# Patient Record
Sex: FEMALE | Race: WHITE | NOT HISPANIC OR LATINO
[De-identification: names, ages, dates, MRNs, and addresses within clinical notes are randomized per-mention and may not be internally consistent; named-entity substitution may affect disease eponyms.]

---

## 2018-11-01 PROBLEM — Z00.00 ENCOUNTER FOR PREVENTIVE HEALTH EXAMINATION: Status: ACTIVE | Noted: 2018-11-01

## 2018-11-16 ENCOUNTER — APPOINTMENT (OUTPATIENT)
Dept: GYNECOLOGIC ONCOLOGY | Facility: CLINIC | Age: 68
End: 2018-11-16
Payer: MEDICARE

## 2018-11-16 VITALS
WEIGHT: 129 LBS | BODY MASS INDEX: 22.02 KG/M2 | HEART RATE: 78 BPM | DIASTOLIC BLOOD PRESSURE: 77 MMHG | OXYGEN SATURATION: 92 % | SYSTOLIC BLOOD PRESSURE: 120 MMHG | HEIGHT: 64 IN

## 2018-11-16 DIAGNOSIS — Z78.9 OTHER SPECIFIED HEALTH STATUS: ICD-10-CM

## 2018-11-16 DIAGNOSIS — R19.00 INTRA-ABDOMINAL AND PELVIC SWELLING, MASS AND LUMP, UNSPECIFIED SITE: ICD-10-CM

## 2018-11-16 PROCEDURE — 99204 OFFICE O/P NEW MOD 45 MIN: CPT | Mod: 25

## 2018-11-16 PROCEDURE — 57454 BX/CURETT OF CERVIX W/SCOPE: CPT

## 2018-11-21 PROBLEM — R19.00 PELVIC MASS: Status: ACTIVE | Noted: 2018-11-15

## 2018-11-21 PROBLEM — Z78.9 SOCIAL ALCOHOL USE: Status: ACTIVE | Noted: 2018-11-21

## 2018-11-27 LAB — LH SURGICAL PATHOLOGY FINAL REPORT: NORMAL

## 2019-09-11 ENCOUNTER — APPOINTMENT (OUTPATIENT)
Dept: PULMONOLOGY | Facility: CLINIC | Age: 69
End: 2019-09-11
Payer: MEDICARE

## 2019-09-11 VITALS
HEART RATE: 81 BPM | HEIGHT: 64 IN | OXYGEN SATURATION: 98 % | SYSTOLIC BLOOD PRESSURE: 110 MMHG | DIASTOLIC BLOOD PRESSURE: 80 MMHG | TEMPERATURE: 97.7 F | BODY MASS INDEX: 21.85 KG/M2 | WEIGHT: 128 LBS

## 2019-09-11 DIAGNOSIS — Z86.39 PERSONAL HISTORY OF OTHER ENDOCRINE, NUTRITIONAL AND METABOLIC DISEASE: ICD-10-CM

## 2019-09-11 PROCEDURE — 94060 EVALUATION OF WHEEZING: CPT

## 2019-09-11 PROCEDURE — 94727 GAS DIL/WSHOT DETER LNG VOL: CPT

## 2019-09-11 PROCEDURE — 94729 DIFFUSING CAPACITY: CPT

## 2019-09-11 PROCEDURE — 99204 OFFICE O/P NEW MOD 45 MIN: CPT | Mod: 25

## 2019-09-11 RX ORDER — LEVOTHYROXINE SODIUM 137 UG/1
TABLET ORAL
Refills: 0 | Status: ACTIVE | COMMUNITY

## 2019-09-11 NOTE — CONSULT LETTER
[Dear  ___] : Dear  [unfilled], [( Thank you for referring [unfilled] for consultation for _____ )] : Thank you for referring [unfilled] for consultation for [unfilled] [Please see my note below.] : Please see my note below. [Consult Closing:] : Thank you very much for allowing me to participate in the care of this patient.  If you have any questions, please do not hesitate to contact me. [Sincerely,] : Sincerely, [FreeTextEntry2] : Lorena Mix MD\par [address]\[place] [address] [FreeTextEntry3] : Bernie Velarde MD, FCCP\par

## 2019-09-11 NOTE — ASSESSMENT
[FreeTextEntry1] : Data reviewed:\par \par CT chest [facility] 8/23/2019 personally reviewed: 1. Patchy nodularity within lateral right middle lobe of lung with bronchiectasis and calcifications compatible with distal airway mucoid impaction secondary to infectious/inflammatory changes. 2. Scattered subcentimeter pulmonary nodules. Recommend follow-up in 12 months with repeat noncontrast chest CT to ensure stability. // though not commented on in report, there's significant mosaic attenuation\par \par [place] 09/11/2019 : restricted / FENO couldn't do\par PFT 9/11/19: NSVA, no sig BD response, low normal TLC, normal DLCO\par \par Impression:\par Chronic cough, nonspecific eval today\par \par Plan:\par Provisional diagnosis of asthma based on history, exam and imaging. Breo 100/5 for a month and then return for re-evaluation. Continue nasal spray.

## 2019-09-11 NOTE — PHYSICAL EXAM
[General Appearance - Well Developed] : well developed [General Appearance - Well Nourished] : well nourished [Normal Conjunctiva] : the conjunctiva exhibited no abnormalities [General Appearance - In No Acute Distress] : no acute distress [Normal Oropharynx] : normal oropharynx [Heart Sounds] : normal S1 and S2 [Heart Rate And Rhythm] : heart rate and rhythm were normal [Murmurs] : no murmurs present [Edema] : no peripheral edema present [Abdomen Soft] : soft [Bowel Sounds] : normal bowel sounds [Abdomen Tenderness] : non-tender [Nail Clubbing] : no clubbing of the fingernails [Cyanosis, Localized] : no localized cyanosis [] : no rash [Affect] : the affect was normal [FreeTextEntry1] : insp > exp wheezing

## 2019-09-11 NOTE — HISTORY OF PRESENT ILLNESS
[FreeTextEntry1] : 09/11/2019: Asked to evaluate patient by Dr Lorena Mix for cough for months. Traveled in spring, got sinus infection and coughing since then. Productive of sputum and then feels relief after producing that sputum. No history of lung disease. Smoked in her 20s only. Physically active. A little wheeze. Sometimes feels dyspneic with a cough attack. Does have nasal drip. No known allergies. Denies GERD sx. 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 Lives apartment in [place] for many years, no animal exposures, no mold/water damage. No fatigue, no weight loss. Her ENT put her on a nasal spray a few weeks ago and this has not impacted the cough much if at all.\par

## 2019-10-29 ENCOUNTER — APPOINTMENT (OUTPATIENT)
Dept: GYNECOLOGIC ONCOLOGY | Facility: CLINIC | Age: 69
End: 2019-10-29
Payer: MEDICARE

## 2019-10-29 PROCEDURE — 57452 EXAM OF CERVIX W/SCOPE: CPT

## 2019-10-29 PROCEDURE — 99215 OFFICE O/P EST HI 40 MIN: CPT | Mod: 25

## 2019-10-30 NOTE — REASON FOR VISIT
[FreeTextEntry1] : 69 yo re-referred by Dr. Diaz for consultation regarding abnormla pap. Unable to obtain ECC due to cervical stenosis

## 2019-10-30 NOTE — PHYSICAL EXAM
[Normal] : Recto-Vaginal Exam: Normal [de-identified] : cervical stenosis, unable to visualize tranformation zone

## 2019-10-30 NOTE — PROCEDURE
[Colposcopy] : colposcopy [LGSIL] : low grade squamous intraepithelial lesion [HPV high risk] : PCR positive for high risk HPV [Patient] : the patient [Cervical Pap Performed] : a cervical pap smear was performed [No Abnormalities] : no abnormalities seen [No Complications] : none [Tolerated Well] : the patient tolerated the procedure well [Post procedure instructions and information given] : post procedure instructions and information given and reviewed with patient. [Vaginal Pap Performed] : no vaginal pap smear was performed [SCJ Fully Visualized] : the squamocolumnar junction was not fully visualized [Biopsies Taken: # ___] : no biopsies were taken of the cervix [ECC Done] : an endocervical curettage was not performed [Endometrial Biopsy Done] : an endometrial biopsy was not performed [de-identified] : Cervical stenosis did not allow for ECC sampling

## 2019-10-30 NOTE — HISTORY OF PRESENT ILLNESS
[FreeTextEntry1] : Problem List\par 1) Abnormal Paps\par    a)Pap 5/31/18 ASCUS, HPV/HR Positive (Non 16/18)\par     b)Colposcopy 7/5/18 ECC inadequate\par     c)Pap 10/11/18 ASCUS, HPV/HR Positive (Non 16/18)\par     d) Colpo with Dr. SEGURA 11/2018 normal *ECC scanty with 1 miniscule fragment equivocal for atypia\par     e) Pap 9/26/19 LSIL HPV+ (non 16/18)

## 2019-11-07 ENCOUNTER — APPOINTMENT (OUTPATIENT)
Dept: PULMONOLOGY | Facility: CLINIC | Age: 69
End: 2019-11-07
Payer: MEDICARE

## 2019-11-07 VITALS
HEIGHT: 64 IN | BODY MASS INDEX: 22.2 KG/M2 | HEART RATE: 70 BPM | WEIGHT: 130 LBS | DIASTOLIC BLOOD PRESSURE: 68 MMHG | TEMPERATURE: 97.8 F | SYSTOLIC BLOOD PRESSURE: 100 MMHG | OXYGEN SATURATION: 96 %

## 2019-11-07 DIAGNOSIS — R05 COUGH: ICD-10-CM

## 2019-11-07 PROCEDURE — 90662 IIV NO PRSV INCREASED AG IM: CPT

## 2019-11-07 PROCEDURE — G0008: CPT

## 2019-11-07 PROCEDURE — 94010 BREATHING CAPACITY TEST: CPT

## 2019-11-07 PROCEDURE — 99213 OFFICE O/P EST LOW 20 MIN: CPT | Mod: 25

## 2019-11-07 RX ORDER — FLUTICASONE FUROATE AND VILANTEROL TRIFENATATE 100; 25 UG/1; UG/1
100-25 POWDER RESPIRATORY (INHALATION) DAILY
Qty: 1 | Refills: 1 | Status: DISCONTINUED | COMMUNITY
Start: 2019-09-11 | End: 2019-11-07

## 2019-11-07 NOTE — CONSULT LETTER
[Dear  ___] : Dear  [unfilled], [Courtesy Letter:] : I had the pleasure of seeing your patient, [unfilled], in my office today. [Please see my note below.] : Please see my note below. [FreeTextEntry2] : Lorena Mix MD\par [address]\[place] [address] [Sincerely,] : Sincerely, [Consult Closing:] : Thank you very much for allowing me to participate in the care of this patient.  If you have any questions, please do not hesitate to contact me. [FreeTextEntry3] : Bernie Velarde MD, FCCP\par

## 2019-11-07 NOTE — PHYSICAL EXAM
[General Appearance - Well Nourished] : well nourished [General Appearance - Well Developed] : well developed [General Appearance - In No Acute Distress] : no acute distress [Heart Rate And Rhythm] : heart rate and rhythm were normal [Heart Sounds] : normal S1 and S2 [Murmurs] : no murmurs present [Auscultation Breath Sounds / Voice Sounds] : lungs were clear to auscultation bilaterally

## 2019-11-07 NOTE — HISTORY OF PRESENT ILLNESS
[FreeTextEntry1] : 09/11/2019: Asked to evaluate patient by Dr Lorena Mix for cough for months. Traveled in spring, got sinus infection and coughing since then. Productive of sputum and then feels relief after producing that sputum. No history of lung disease. Smoked in her 20s only. Physically active. A little wheeze. Sometimes feels dyspneic with a cough attack. Does have nasal drip. No known allergies. Denies GERD sx. 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 Lives apartment in [place] for many years, no animal exposures, no mold/water damage. No fatigue, no weight loss. Her ENT put her on a nasal spray a few weeks ago and this has not impacted the cough much if at all.\par \par 11/7/19: She took the Breo and found it to be very effective; after one month the cough was gone and she stopped the Breo and the cough has not recurred.\par

## 2019-11-07 NOTE — HISTORY OF PRESENT ILLNESS
[FreeTextEntry1] : 09/11/2019: Asked to evaluate patient by Dr Lorena Mix for cough for months. Traveled in spring, got sinus infection and coughing since then. Productive of sputum and then feels relief after producing that sputum. No history of lung disease. Smoked in her 20s only. Physically active. A little wheeze. Sometimes feels dyspneic with a cough attack. Does have nasal drip. No known allergies. Denies GERD sx. 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 Lives apartment in [place] for many years, no animal exposures, no mold/water damage. No fatigue, no weight loss. Her ENT put her on a nasal spray a few weeks ago and this has not impacted the cough much if at all.\par \par 11/7/19: She took the Breo and found it to be very effective; after one month the cough was gone and she stopped the Breo and the cough has not recurred.\par

## 2019-11-07 NOTE — ASSESSMENT
[FreeTextEntry1] : Data reviewed:\par \par CT chest [facility] 8/23/2019: 1. Patchy nodularity within lateral right middle lobe of lung with bronchiectasis and calcifications compatible with distal airway mucoid impaction secondary to infectious/inflammatory changes. 2. Scattered subcentimeter pulmonary nodules. Recommend follow-up in 12 months with repeat noncontrast chest CT to ensure stability. // though not commented on in report, there's significant mosaic attenuation\par \par [place] 09/11/2019 : restricted, FEV1 65% / FENO couldn't do\par PFT 9/11/19: NSVA, no sig BD response, low normal TLC, normal DLCO\par [place] 11/7/19: restricted, FEV1 71%\par \par Impression:\par Chronic cough, working dx was asthma, resolved at this time\par \par Plan:\par Discussed that working dx was asthma but this was unproven. She improved while on Breo (because of Breo?) and is not coughing one month after discontinuing. Stay off inhalers, and return if the symptoms recur.\par Flu vaccine given.\par Verify w Dr Mix that she had pneumococcal vax.

## 2019-11-07 NOTE — CONSULT LETTER
[Dear  ___] : Dear  [unfilled], [Courtesy Letter:] : I had the pleasure of seeing your patient, [unfilled], in my office today. [Consult Closing:] : Thank you very much for allowing me to participate in the care of this patient.  If you have any questions, please do not hesitate to contact me. [Please see my note below.] : Please see my note below. [FreeTextEntry2] : Lorena Mix MD\par [address]\[place] [address] [Sincerely,] : Sincerely, [FreeTextEntry3] : Bernie Velarde MD, FCCP\par

## 2019-11-07 NOTE — PHYSICAL EXAM
[General Appearance - Well Nourished] : well nourished [General Appearance - In No Acute Distress] : no acute distress [General Appearance - Well Developed] : well developed [Heart Rate And Rhythm] : heart rate and rhythm were normal [Murmurs] : no murmurs present [Heart Sounds] : normal S1 and S2 [Auscultation Breath Sounds / Voice Sounds] : lungs were clear to auscultation bilaterally

## 2019-11-07 NOTE — ASSESSMENT
[FreeTextEntry1] : Data reviewed:\par \par CT chest [facility] 8/23/2019: 1. Patchy nodularity within lateral right middle lobe of lung with bronchiectasis and calcifications compatible with distal airway mucoid impaction secondary to infectious/inflammatory changes. 2. Scattered subcentimeter pulmonary nodules. Recommend follow-up in 12 months with repeat noncontrast chest CT to ensure stability. // though not commented on in report, there's significant mosaic attenuation\par \par [place] 09/11/2019 : restricted, FEV1 65% / FENO couldn't do\par PFT 9/11/19: NSVA, no sig BD response, low normal TLC, normal DLCO\par [place] 11/7/19: restricted, FEV1 71%\par \par Impression:\par Chronic cough, working dx was asthma, resolved at this time\par \par Plan:\par Discussed that working dx was asthma but this was unproven. She improved while on Breo (because of Breo?) and is not coughing one month after discontinuing. Stay off inhalers, and return if the symptoms recur.\par Flu vaccine given.\par Verify w Dr Mix that she had pneumococcal vax.

## 2019-11-08 ENCOUNTER — OUTPATIENT (OUTPATIENT)
Dept: OUTPATIENT SERVICES | Facility: HOSPITAL | Age: 69
LOS: 1 days | End: 2019-11-08
Payer: COMMERCIAL

## 2019-11-08 ENCOUNTER — RESULT REVIEW (OUTPATIENT)
Age: 69
End: 2019-11-08

## 2019-11-08 DIAGNOSIS — R87.612 LOW GRADE SQUAMOUS INTRAEPITHELIAL LESION ON CYTOLOGIC SMEAR OF CERVIX (LGSIL): ICD-10-CM

## 2019-11-08 DIAGNOSIS — R87.610 ATYPICAL SQUAMOUS CELLS OF UNDETERMINED SIGNIFICANCE ON CYTOLOGIC SMEAR OF CERVIX (ASC-US): ICD-10-CM

## 2019-11-08 PROCEDURE — 88321 CONSLTJ&REPRT SLD PREP ELSWR: CPT

## 2019-11-11 LAB — CYTOLOGY SPEC DOC CYTO: SIGNIFICANT CHANGE UP

## 2020-01-16 ENCOUNTER — OUTPATIENT (OUTPATIENT)
Dept: OUTPATIENT SERVICES | Facility: HOSPITAL | Age: 70
LOS: 1 days | End: 2020-01-16

## 2020-01-16 DIAGNOSIS — R87.612 LOW GRADE SQUAMOUS INTRAEPITHELIAL LESION ON CYTOLOGIC SMEAR OF CERVIX (LGSIL): ICD-10-CM

## 2020-01-16 DIAGNOSIS — R87.610 ATYPICAL SQUAMOUS CELLS OF UNDETERMINED SIGNIFICANCE ON CYTOLOGIC SMEAR OF CERVIX (ASC-US): ICD-10-CM

## 2020-01-17 ENCOUNTER — RESULT REVIEW (OUTPATIENT)
Age: 70
End: 2020-01-17

## 2020-01-17 ENCOUNTER — OUTPATIENT (OUTPATIENT)
Dept: OUTPATIENT SERVICES | Facility: HOSPITAL | Age: 70
LOS: 1 days | Discharge: HOME | End: 2020-01-17
Payer: MEDICARE

## 2020-01-17 ENCOUNTER — APPOINTMENT (OUTPATIENT)
Dept: GYNECOLOGIC ONCOLOGY | Facility: AMBULATORY SURGERY CENTER | Age: 70
End: 2020-01-17

## 2020-01-17 PROCEDURE — 88307 TISSUE EXAM BY PATHOLOGIST: CPT | Mod: 26,59

## 2020-01-17 PROCEDURE — 88342 IMHCHEM/IMCYTCHM 1ST ANTB: CPT | Mod: 26,59

## 2020-01-17 PROCEDURE — 88321 CONSLTJ&REPRT SLD PREP ELSWR: CPT | Mod: 26

## 2020-01-17 PROCEDURE — 88360 TUMOR IMMUNOHISTOCHEM/MANUAL: CPT | Mod: 26,59

## 2020-01-17 PROCEDURE — 88341 IMHCHEM/IMCYTCHM EA ADD ANTB: CPT | Mod: 26,59

## 2020-01-17 PROCEDURE — 88305 TISSUE EXAM BY PATHOLOGIST: CPT | Mod: 26,59

## 2020-01-17 PROCEDURE — 57522 CONIZATION OF CERVIX: CPT | Mod: 22

## 2020-01-20 LAB — CYTOLOGY SPEC DOC CYTO: SIGNIFICANT CHANGE UP

## 2020-01-22 LAB — SURGICAL PATHOLOGY STUDY: SIGNIFICANT CHANGE UP

## 2020-03-06 ENCOUNTER — APPOINTMENT (OUTPATIENT)
Dept: GYNECOLOGIC ONCOLOGY | Facility: CLINIC | Age: 70
End: 2020-03-06
Payer: MEDICARE

## 2020-03-06 ENCOUNTER — APPOINTMENT (OUTPATIENT)
Dept: GYNECOLOGIC ONCOLOGY | Facility: CLINIC | Age: 70
End: 2020-03-06

## 2020-03-06 VITALS
SYSTOLIC BLOOD PRESSURE: 102 MMHG | WEIGHT: 133 LBS | DIASTOLIC BLOOD PRESSURE: 63 MMHG | HEART RATE: 81 BPM | BODY MASS INDEX: 22.71 KG/M2 | HEIGHT: 64 IN | OXYGEN SATURATION: 95 %

## 2020-03-06 DIAGNOSIS — R87.610 ATYPICAL SQUAMOUS CELLS OF UNDETERMINED SIGNIFICANCE ON CYTOLOGIC SMEAR OF CERVIX (ASC-US): ICD-10-CM

## 2020-03-06 DIAGNOSIS — R87.612 LOW GRADE SQUAMOUS INTRAEPITHELIAL LESION ON CYTOLOGIC SMEAR OF CERVIX (LGSIL): ICD-10-CM

## 2020-03-06 DIAGNOSIS — R87.810 ATYPICAL SQUAMOUS CELLS OF UNDETERMINED SIGNIFICANCE ON CYTOLOGIC SMEAR OF CERVIX (ASC-US): ICD-10-CM

## 2020-03-06 PROCEDURE — 99024 POSTOP FOLLOW-UP VISIT: CPT

## 2020-03-06 NOTE — PROCEDURE
[Vaginal Pap Performed] : no vaginal pap smear was performed [SCJ Fully Visualized] : the squamocolumnar junction was not fully visualized [Biopsies Taken: # ___] : no biopsies were taken of the cervix [ECC Done] : an endocervical curettage was not performed [Endometrial Biopsy Done] : an endometrial biopsy was not performed [de-identified] : Cervical stenosis did not allow for ECC sampling

## 2020-03-06 NOTE — ASSESSMENT
[FreeTextEntry1] : Patient doing well, LGSIL on pathology\par \par Recommend PAP in 1 year with co-testing. We discussed that her remaining cervix would be two short for an additional cone so in the future any additional HPV related dysplasia would have to be managed with a hysterectomy.\par \par

## 2020-03-06 NOTE — REASON FOR VISIT
[FreeTextEntry1] : 7 week f/u s/p LEEP on 1/17/2020. Patient is doing well post-op and reports "zero" issues since the procdure.\par \par Pathology findings discussed in detail.

## 2024-10-09 ENCOUNTER — NON-APPOINTMENT (OUTPATIENT)
Age: 74
End: 2024-10-09

## 2024-10-09 ENCOUNTER — APPOINTMENT (OUTPATIENT)
Dept: PULMONOLOGY | Facility: CLINIC | Age: 74
End: 2024-10-09
Payer: MEDICARE

## 2024-10-09 VITALS
TEMPERATURE: 98.2 F | WEIGHT: 130 LBS | BODY MASS INDEX: 22.2 KG/M2 | HEIGHT: 64 IN | HEART RATE: 80 BPM | DIASTOLIC BLOOD PRESSURE: 80 MMHG | SYSTOLIC BLOOD PRESSURE: 120 MMHG | OXYGEN SATURATION: 89 %

## 2024-10-09 DIAGNOSIS — Z87.891 PERSONAL HISTORY OF NICOTINE DEPENDENCE: ICD-10-CM

## 2024-10-09 PROCEDURE — 94010 BREATHING CAPACITY TEST: CPT

## 2024-10-09 PROCEDURE — 99205 OFFICE O/P NEW HI 60 MIN: CPT | Mod: 25

## 2024-10-10 ENCOUNTER — APPOINTMENT (OUTPATIENT)
Dept: PULMONOLOGY | Facility: CLINIC | Age: 74
End: 2024-10-10
Payer: MEDICARE

## 2024-10-10 VITALS
BODY MASS INDEX: 22.2 KG/M2 | TEMPERATURE: 98.1 F | OXYGEN SATURATION: 91 % | SYSTOLIC BLOOD PRESSURE: 106 MMHG | WEIGHT: 130 LBS | HEIGHT: 64 IN | DIASTOLIC BLOOD PRESSURE: 60 MMHG | HEART RATE: 83 BPM

## 2024-10-10 PROCEDURE — 99213 OFFICE O/P EST LOW 20 MIN: CPT | Mod: 25

## 2024-10-10 PROCEDURE — 94727 GAS DIL/WSHOT DETER LNG VOL: CPT

## 2024-10-10 PROCEDURE — 94729 DIFFUSING CAPACITY: CPT

## 2024-10-10 PROCEDURE — 94060 EVALUATION OF WHEEZING: CPT

## 2024-10-15 LAB — BACTERIA SPT CULT: ABNORMAL

## 2024-10-19 LAB
ACID FAST STN SPT: NORMAL
FUNGUS SPT CULT: NORMAL

## 2024-11-19 ENCOUNTER — APPOINTMENT (OUTPATIENT)
Dept: PULMONOLOGY | Facility: CLINIC | Age: 74
End: 2024-11-19
Payer: MEDICARE

## 2024-11-19 VITALS
HEART RATE: 81 BPM | OXYGEN SATURATION: 98 % | SYSTOLIC BLOOD PRESSURE: 110 MMHG | TEMPERATURE: 96.7 F | DIASTOLIC BLOOD PRESSURE: 70 MMHG

## 2024-11-19 DIAGNOSIS — Z23 ENCOUNTER FOR IMMUNIZATION: ICD-10-CM

## 2024-11-19 PROCEDURE — G0008: CPT

## 2024-11-19 PROCEDURE — 90662 IIV NO PRSV INCREASED AG IM: CPT

## 2024-11-19 PROCEDURE — 99213 OFFICE O/P EST LOW 20 MIN: CPT

## 2024-11-19 PROCEDURE — G2211 COMPLEX E/M VISIT ADD ON: CPT

## 2025-05-12 ENCOUNTER — APPOINTMENT (OUTPATIENT)
Dept: PULMONOLOGY | Facility: CLINIC | Age: 75
End: 2025-05-12